# Patient Record
Sex: FEMALE | Race: WHITE | Employment: UNEMPLOYED | URBAN - METROPOLITAN AREA
[De-identification: names, ages, dates, MRNs, and addresses within clinical notes are randomized per-mention and may not be internally consistent; named-entity substitution may affect disease eponyms.]

---
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
## 2022-03-19 PROBLEM — E66.01 MORBID OBESITY WITH BMI OF 45.0-49.9, ADULT (HCC): Status: ACTIVE | Noted: 2017-11-15

## 2022-04-11 ENCOUNTER — OFFICE VISIT (OUTPATIENT)
Dept: ORTHOPEDIC SURGERY | Age: 22
End: 2022-04-11
Payer: OTHER GOVERNMENT

## 2022-04-11 VITALS — HEART RATE: 140 BPM | OXYGEN SATURATION: 98 % | WEIGHT: 293 LBS | BODY MASS INDEX: 55.32 KG/M2 | HEIGHT: 61 IN

## 2022-04-11 DIAGNOSIS — M22.2X2 PATELLOFEMORAL SYNDROME OF LEFT KNEE: ICD-10-CM

## 2022-04-11 DIAGNOSIS — G89.29 CHRONIC PAIN OF BOTH KNEES: Primary | ICD-10-CM

## 2022-04-11 DIAGNOSIS — M22.2X1 PATELLOFEMORAL SYNDROME OF RIGHT KNEE: ICD-10-CM

## 2022-04-11 DIAGNOSIS — M25.561 CHRONIC PAIN OF BOTH KNEES: Primary | ICD-10-CM

## 2022-04-11 DIAGNOSIS — M25.562 CHRONIC PAIN OF BOTH KNEES: Primary | ICD-10-CM

## 2022-04-11 DIAGNOSIS — M25.50 MULTIPLE JOINT PAIN: ICD-10-CM

## 2022-04-11 PROCEDURE — 99203 OFFICE O/P NEW LOW 30 MIN: CPT | Performed by: ORTHOPAEDIC SURGERY

## 2022-04-11 PROCEDURE — 73560 X-RAY EXAM OF KNEE 1 OR 2: CPT | Performed by: ORTHOPAEDIC SURGERY

## 2022-04-11 RX ORDER — VILAZODONE HYDROCHLORIDE 20 MG/1
TABLET ORAL
COMMUNITY
Start: 2022-02-21

## 2022-04-11 RX ORDER — PRAZOSIN HYDROCHLORIDE 2 MG/1
CAPSULE ORAL
COMMUNITY
Start: 2022-03-17

## 2022-04-11 RX ORDER — GALCANEZUMAB 120 MG/ML
INJECTION, SOLUTION SUBCUTANEOUS
COMMUNITY
Start: 2022-04-06

## 2022-04-11 RX ORDER — HYDROXYZINE 25 MG/1
TABLET, FILM COATED ORAL
COMMUNITY
Start: 2022-02-10

## 2022-04-11 RX ORDER — RIZATRIPTAN BENZOATE 10 MG/1
TABLET, ORALLY DISINTEGRATING ORAL
COMMUNITY
Start: 2022-04-06

## 2022-04-11 RX ORDER — ASPIRIN 325 MG
TABLET, DELAYED RELEASE (ENTERIC COATED) ORAL
COMMUNITY
Start: 2022-02-07

## 2022-04-11 RX ORDER — MELOXICAM 15 MG/1
15 TABLET ORAL DAILY
Qty: 30 TABLET | Refills: 1 | Status: SHIPPED | OUTPATIENT
Start: 2022-04-11

## 2022-04-11 RX ORDER — BUSPIRONE HYDROCHLORIDE 7.5 MG/1
TABLET ORAL
COMMUNITY
Start: 2022-02-17

## 2022-04-11 NOTE — PROGRESS NOTES
Bandar Valdivia  2000   Chief Complaint   Patient presents with    Knee Pain     both knees         HISTORY OF PRESENT ILLNESS  Bandar Valdivia is a 24 y.o. adult who presents today for evaluation of b/l knee. Bandar Valdivia rates Amarilys Castro's pain 2/10 today. Pain has been present since 2018. Having an aching and popping sensation that is intermittent in nature. Notes cracking when she was younger. Pain getting up from a sitting position and prolonged walking. Feels as though the knee is shifting. No prior treatment. She has c/o multiple joint pain in the ankle, knees, wrist and neck. She is hypermobile. Patient describes the pain as aching and popping and stiffness that is Intermittent in nature. Symptoms are worse with bending, stretching and straightening and is better with  patches, Ice. Associated symptoms include nothing. Since problem started, it: has worsened. Pain does not wake patient up at night. Has taken no meds for the problem. Has tried following treatments: Injections:NO; Brace:NO;  Therapy:NO; Cane/Crutch:NO       Allergies   Allergen Reactions    Pcn [Penicillins] Hives        Past Medical History:   Diagnosis Date    Depression     anxiety    GERD (gastroesophageal reflux disease)     Headache     migraines    Insomnia     Major depression     Migraine headache     PCOS (polycystic ovarian syndrome)     Polycystic ovarian disease       Social History     Socioeconomic History    Marital status: SINGLE     Spouse name: Not on file    Number of children: Not on file    Years of education: Not on file    Highest education level: Not on file   Occupational History    Not on file   Tobacco Use    Smoking status: Never Smoker    Smokeless tobacco: Never Used   Substance and Sexual Activity    Alcohol use: No    Drug use: No    Sexual activity: Not on file   Other Topics Concern    Not on file   Social History Narrative    Not on file     Social Determinants of Health     Financial Resource Strain:     Difficulty of Paying Living Expenses: Not on file   Food Insecurity:     Worried About Running Out of Food in the Last Year: Not on file    Naina of Food in the Last Year: Not on file   Transportation Needs:     Lack of Transportation (Medical): Not on file    Lack of Transportation (Non-Medical): Not on file   Physical Activity:     Days of Exercise per Week: Not on file    Minutes of Exercise per Session: Not on file   Stress:     Feeling of Stress : Not on file   Social Connections:     Frequency of Communication with Friends and Family: Not on file    Frequency of Social Gatherings with Friends and Family: Not on file    Attends Congregational Services: Not on file    Active Member of [address] [place] [address] [email or web address] or Organizations: Not on file    Attends Club or Organization Meetings: Not on file    Marital Status: Not on file   Intimate Partner Violence:     Fear of Current or Ex-Partner: Not on file    Emotionally Abused: Not on file    Physically Abused: Not on file    Sexually Abused: Not on file   Housing Stability:     Unable to Pay for Housing in the Last Year: Not on file    Number of [place] in the Last Year: Not on file    Unstable Housing in the Last Year: Not on file      History reviewed. No pertinent surgical history. Family History   Problem Relation Age of Onset    Diabetes Mother         type 2    Hypertension Mother     Thyroid Disease Father       Current Outpatient Medications   Medication Sig    Emgality Pen 120 mg/mL injection     cholecalciferol (VITAMIN D3) (50,000 UNITS /1250 MCG) capsule TAKE 1 CAPSULE BY MOUTH 2 TIMES A WEEK    Viibryd 20 mg tab tablet     rizatriptan (MAXALT-MLT) 10 mg disintegrating tablet     prazosin (MINIPRESS) 2 mg capsule     busPIRone (BUSPAR) 7.5 mg tablet     hydrOXYzine HCL (ATARAX) 25 mg tablet     fluvoxaMINE (LUVOX) 100 mg tablet Take 150 mg by mouth every evening.     propranolol (INDERAL) 60 mg tablet Take 60 mg by mouth three (3) times daily. (Patient not taking: Reported on 4/11/2022)     No current facility-administered medications for this visit. REVIEW OF SYSTEM   Patient denies: Weight loss, Fever/Chills, HA, Visual changes, Fatigue, Chest pain, SOB, Abdominal pain, N/V/D/C, Blood in stool or urine, Edema. Pertinent positive as above in HPI. All others were negative    PHYSICAL EXAM:   Visit Vitals  Pulse (!) 140 Comment: states she will tell her pcp about this   Ht 5' 0.5\" (1.537 m)   Wt 319 lb (144.7 kg)   SpO2 98%   BMI 61.27 kg/m²     The patient is a well-developed, well-nourished adult   in no acute distress. The patient is alert and oriented times three. The patient is alert and oriented times three. Mood and affect are normal.  LYMPHATIC: lymph nodes are not enlarged and are within normal limits  SKIN: normal in color and non tender to palpation. There are no bruises or abrasions noted. NEUROLOGICAL: Motor sensory exam is within normal limits. Reflexes are equal bilaterally. There is normal sensation to pinprick and light touch  MUSCULOSKELETAL:  Examination Left knee Right knee   Skin Intact Intact   Range of motion 0-130 0-130   Effusion + +   Medial joint line tenderness + +   Lateral joint line tenderness - -   Tenderness Pes Bursa - -   Tenderness insertion MCL - -   Tenderness insertion LCL - -   Harishs - -   Patella crepitus + +   Patella grind + +   Lachman - -   Pivot shift - -   Anterior drawer - -   Posterior drawer - -   Varus stress - -   Valgus stress - -   Neurovascular Intact Intact   Calf Swelling and Tenderness to Palpation - -   Cristin's Test - -   Hamstring Cord Tightness - -        PROCEDURE: none    IMAGING: XR of the b/l knee with 2 views obtained in the office dated 4/11/2022 was reviewed and read by Dr. Rebbeca Nageotte: mild degenerative changes in the patellofemoral joint      IMPRESSION:      ICD-10-CM ICD-9-CM    1.  Chronic pain of both knees  M25.561 719.46 AMB POC XRAY, KNEE; 1/2 VIEWS    M25.562 338.29 AMB POC XRAY, KNEE; 1/2 VIEWS    G89.29     2. Patellofemoral syndrome of left knee  M22.2X2 719.46 REFERRAL TO PHYSICAL THERAPY   3. Patellofemoral syndrome of right knee  M22.2X1 719.46 REFERRAL TO PHYSICAL THERAPY   4. Multiple joint pain  M25.50 719.49 REFERRAL TO RHEUMATOLOGY        PLAN:  1. Patient presents today with b/l knee pain due to patellofemoral syndrome. I am hopeful PT and Mobic will provide relief. Start with Mobic to help with inflammation. Referring to rheumatology to assess multiple joint pain. Risk factors include: BMI>60  2. No ultrasound exam indicated today  3. No cortisone injection indicated today   4. Yes Physical/Occupational Therapy indicated today  5. No diagnostic test indicated today:   6. No durable medical equipment indicated today  7. Yes referral indicated today RHEUM   8. Yes medications indicated today:   9. No Narcotic indicated today       RTC 6 weeks       Scribed by Hina Abreu [facility]) as dictated by Radha Dyer MD    I, Dr. Radha Dyer, confirm that all documentation is accurate.     Radha Dyer M.D.   [facility]

## 2022-04-21 RX ORDER — GUANFACINE 1 MG/1
1 TABLET, EXTENDED RELEASE ORAL DAILY
COMMUNITY

## 2022-04-21 NOTE — PERIOP NOTES
PRE-SURGICAL INSTRUCTIONS        Patient's Name:  Hilton Guzman      VZHFK'[id number] Date:  4/21/2022            Covid Testing Date and Time:    Surgery Date:  4/26/2022                1. Do NOT eat or drink anything, including candy, gum, or ice chips after midnight on 04/25/22, unless you have specific instructions from your surgeon or anesthesia provider to do so.  2. You may brush your teeth before coming to the hospital.  3. No smoking 24 hours prior to the day of surgery. 4. No alcohol 24 hours prior to the day of surgery. 5. No recreational drugs for one week prior to the day of surgery. 6. Leave all valuables, including money/purse, at home. 7. Remove all jewelry, nail polish, acrylic nails, and makeup (including mascara); no lotions powders, deodorant, or perfume/cologne/after shave on the skin. 8. Follow instruction for Hibiclens washes and CHG wipes from surgeon's office. 9. Glasses/contact lenses and dentures may be worn to the hospital.  They will be removed prior to surgery. 10. Call your doctor if symptoms of a cold or illness develop within 24-48 hours prior to your surgery. 11.  If you are having an outpatient procedure, please make arrangements for a responsible ADULT TO [address] [place] [address] and stay with you for 24 hours after your surgery. 12. ONE VISITOR in the hospital at this time for outpatient procedures. Exceptions may be made for surgical admissions, per nursing unit guidelines      Special Instructions:      Bring list of CURRENT medications. Bring any pertinent legal medical records. Follow physician instructions about stopping anticoagulants. Complete bowel prep per MD instructions. On the day of surgery, come in the main entrance of DR[facility] CR[facility]". Let the  at the desk know you are there for surgery. A staff member will come escort you to the surgical area on the second floor.     If you have any questions or concerns, please do not hesitate to call:     (Prior to the day of surgery) [facility] department:  [phone]   (Day of surgery) Pre-Op department:  [phone]    These surgical instructions were reviewed with patient during the PAT phone call.

## 2022-04-25 ENCOUNTER — ANESTHESIA EVENT (OUTPATIENT)
Dept: ENDOSCOPY | Age: 22
End: 2022-04-25
Payer: OTHER GOVERNMENT

## 2022-04-26 ENCOUNTER — ANESTHESIA (OUTPATIENT)
Dept: ENDOSCOPY | Age: 22
End: 2022-04-26
Payer: OTHER GOVERNMENT

## 2022-04-26 ENCOUNTER — HOSPITAL ENCOUNTER (OUTPATIENT)
Age: 22
Setting detail: OUTPATIENT SURGERY
Discharge: HOME | End: 2022-04-26
Attending: INTERNAL MEDICINE | Admitting: INTERNAL MEDICINE
Payer: OTHER GOVERNMENT

## 2022-04-26 VITALS
DIASTOLIC BLOOD PRESSURE: 90 MMHG | SYSTOLIC BLOOD PRESSURE: 137 MMHG | BODY MASS INDEX: 47.09 KG/M2 | HEART RATE: 108 BPM | HEIGHT: 66 IN | RESPIRATION RATE: 20 BRPM | TEMPERATURE: 99 F | OXYGEN SATURATION: 97 % | WEIGHT: 293 LBS

## 2022-04-26 LAB — HCG SERPL QL: NEGATIVE

## 2022-04-26 PROCEDURE — 88305 TISSUE EXAM BY PATHOLOGIST: CPT

## 2022-04-26 PROCEDURE — 74011000250 HC RX REV CODE- 250: Performed by: NURSE ANESTHETIST, CERTIFIED REGISTERED

## 2022-04-26 PROCEDURE — 74011250636 HC RX REV CODE- 250/636: Performed by: NURSE ANESTHETIST, CERTIFIED REGISTERED

## 2022-04-26 PROCEDURE — 76040000007: Performed by: INTERNAL MEDICINE

## 2022-04-26 PROCEDURE — 84703 CHORIONIC GONADOTROPIN ASSAY: CPT

## 2022-04-26 PROCEDURE — 2709999900 HC NON-CHARGEABLE SUPPLY: Performed by: INTERNAL MEDICINE

## 2022-04-26 PROCEDURE — 77030019988 HC FCPS ENDOSC DISP BSC -B: Performed by: INTERNAL MEDICINE

## 2022-04-26 PROCEDURE — 77030013992 HC SNR POLYP ENDOSC BSC -B: Performed by: INTERNAL MEDICINE

## 2022-04-26 PROCEDURE — 77030021593 HC FCPS BIOP ENDOSC BSC -A: Performed by: INTERNAL MEDICINE

## 2022-04-26 PROCEDURE — 00813 ANES UPR LWR GI NDSC PX: CPT | Performed by: STUDENT IN AN ORGANIZED HEALTH CARE EDUCATION/TRAINING PROGRAM

## 2022-04-26 PROCEDURE — 77030008565 HC TBNG SUC IRR ERBE -B: Performed by: INTERNAL MEDICINE

## 2022-04-26 PROCEDURE — 00813 ANES UPR LWR GI NDSC PX: CPT | Performed by: NURSE ANESTHETIST, CERTIFIED REGISTERED

## 2022-04-26 PROCEDURE — 76060000032 HC ANESTHESIA 0.5 TO 1 HR: Performed by: INTERNAL MEDICINE

## 2022-04-26 RX ORDER — SODIUM CHLORIDE, SODIUM LACTATE, POTASSIUM CHLORIDE, CALCIUM CHLORIDE 600; 310; 30; 20 MG/100ML; MG/100ML; MG/100ML; MG/100ML
25 INJECTION, SOLUTION INTRAVENOUS CONTINUOUS
Status: CANCELLED | OUTPATIENT
Start: 2022-04-26

## 2022-04-26 RX ORDER — SODIUM CHLORIDE, SODIUM LACTATE, POTASSIUM CHLORIDE, CALCIUM CHLORIDE 600; 310; 30; 20 MG/100ML; MG/100ML; MG/100ML; MG/100ML
75 INJECTION, SOLUTION INTRAVENOUS CONTINUOUS
Status: DISCONTINUED | OUTPATIENT
Start: 2022-04-26 | End: 2022-04-26 | Stop reason: HOSPADM

## 2022-04-26 RX ORDER — SODIUM CHLORIDE 0.9 % (FLUSH) 0.9 %
5-40 SYRINGE (ML) INJECTION AS NEEDED
Status: DISCONTINUED | OUTPATIENT
Start: 2022-04-26 | End: 2022-04-26 | Stop reason: HOSPADM

## 2022-04-26 RX ORDER — PROPOFOL 10 MG/ML
INJECTION, EMULSION INTRAVENOUS AS NEEDED
Status: DISCONTINUED | OUTPATIENT
Start: 2022-04-26 | End: 2022-04-26 | Stop reason: HOSPADM

## 2022-04-26 RX ORDER — ONDANSETRON 2 MG/ML
4 INJECTION INTRAMUSCULAR; INTRAVENOUS ONCE
Status: CANCELLED | OUTPATIENT
Start: 2022-04-26 | End: 2022-04-26

## 2022-04-26 RX ORDER — SODIUM CHLORIDE 0.9 % (FLUSH) 0.9 %
5-40 SYRINGE (ML) INJECTION EVERY 8 HOURS
Status: DISCONTINUED | OUTPATIENT
Start: 2022-04-26 | End: 2022-04-26 | Stop reason: HOSPADM

## 2022-04-26 RX ORDER — LIDOCAINE HYDROCHLORIDE 20 MG/ML
INJECTION, SOLUTION EPIDURAL; INFILTRATION; INTRACAUDAL; PERINEURAL AS NEEDED
Status: DISCONTINUED | OUTPATIENT
Start: 2022-04-26 | End: 2022-04-26 | Stop reason: HOSPADM

## 2022-04-26 RX ORDER — LIDOCAINE HYDROCHLORIDE 10 MG/ML
0.1 INJECTION, SOLUTION EPIDURAL; INFILTRATION; INTRACAUDAL; PERINEURAL AS NEEDED
Status: DISCONTINUED | OUTPATIENT
Start: 2022-04-26 | End: 2022-04-26 | Stop reason: HOSPADM

## 2022-04-26 RX ADMIN — PROPOFOL 50 MG: 10 INJECTION, EMULSION INTRAVENOUS at 14:09

## 2022-04-26 RX ADMIN — LIDOCAINE HYDROCHLORIDE 100 MG: 20 INJECTION, SOLUTION EPIDURAL; INFILTRATION; INTRACAUDAL; PERINEURAL at 13:50

## 2022-04-26 RX ADMIN — PROPOFOL 50 MG: 10 INJECTION, EMULSION INTRAVENOUS at 14:00

## 2022-04-26 RX ADMIN — PROPOFOL 50 MG: 10 INJECTION, EMULSION INTRAVENOUS at 14:01

## 2022-04-26 RX ADMIN — PROPOFOL 50 MG: 10 INJECTION, EMULSION INTRAVENOUS at 13:55

## 2022-04-26 RX ADMIN — PROPOFOL 50 MG: 10 INJECTION, EMULSION INTRAVENOUS at 14:08

## 2022-04-26 RX ADMIN — SODIUM CHLORIDE, POTASSIUM CHLORIDE, SODIUM LACTATE AND CALCIUM CHLORIDE 75 ML/HR: 600; 310; 30; 20 INJECTION, SOLUTION INTRAVENOUS at 13:04

## 2022-04-26 RX ADMIN — PROPOFOL 50 MG: 10 INJECTION, EMULSION INTRAVENOUS at 13:57

## 2022-04-26 RX ADMIN — PROPOFOL 100 MG: 10 INJECTION, EMULSION INTRAVENOUS at 13:50

## 2022-04-26 RX ADMIN — PROPOFOL 50 MG: 10 INJECTION, EMULSION INTRAVENOUS at 13:52

## 2022-04-26 RX ADMIN — PROPOFOL 50 MG: 10 INJECTION, EMULSION INTRAVENOUS at 13:58

## 2022-04-26 RX ADMIN — PROPOFOL 50 MG: 10 INJECTION, EMULSION INTRAVENOUS at 13:53

## 2022-04-26 RX ADMIN — FAMOTIDINE 20 MG: 10 INJECTION INTRAVENOUS at 13:13

## 2022-04-26 RX ADMIN — PROPOFOL 50 MG: 10 INJECTION, EMULSION INTRAVENOUS at 14:06

## 2022-04-26 RX ADMIN — PROPOFOL 50 MG: 10 INJECTION, EMULSION INTRAVENOUS at 14:03

## 2022-04-26 NOTE — DISCHARGE INSTRUCTIONS
Upper GI Endoscopy: What to Expect at [address] had an upper GI endoscopy. Your doctor used a thin, lighted tube that bends to look at the inside of your esophagus, your stomach, and the first part of the small intestine, called the duodenum. After you have an endoscopy, you will stay at the hospital or clinic for 1 to 2 hours. This will allow the medicine to wear off. You will be able to go home after your doctor or nurse checks to make sure that you're not having any problems. You may have to stay overnight if you had treatment during the test. You may have a sore throat for a day or two after the test.  This care sheet gives you a general idea about what to expect after the test.  How can you care for yourself at home? Activity   · Rest as much as you need to after you go home. · You should be able to go back to your usual activities the day after the test.  Diet   · Follow your doctor's directions for eating after the test.  · Drink plenty of fluids (unless your doctor has told you not to). Medications   · If you have a sore throat the day after the test, use an over-the-counter spray to numb your throat. Follow-up care is a key part of your treatment and safety. Be sure to make and go to all appointments, and call your doctor if you are having problems. It's also a good idea to know your test results and keep a list of the medicines you take. When should you call for help? Call 911 anytime you think you may need emergency care. For example, call if:    · You passed out (lost consciousness). · You have trouble breathing. · You pass maroon or bloody stools. Call your doctor now or seek immediate medical care if:    · You have pain that does not get better after your take pain medicine. · You have new or worse belly pain. · You have blood in your stools. · You are sick to your stomach and cannot keep fluids down. · You have a fever.      · You cannot pass stools or gas.   Watch closely for changes in your health, and be sure to contact your doctor if:    · Your throat still hurts after a day or two. · You do not get better as expected. Where can you learn more? Go to http://www.lee.com/  Enter J454 in the search box to learn more about \"Upper GI Endoscopy: What to Expect at Home. \"  Current as of: September 8, 2021               Content Version: 13.2  © 2006-2022 [employer]. Care instructions adapted under license by [employer] (which disclaims liability or warranty for this information). If you have questions about a medical condition or this instruction, always ask your healthcare professional. [place] [address] any warranty or liability for your use of this information. Patient Education      Hiatal Hernia: Care Instructions  Your Care Instructions  A hiatal hernia occurs when part of the stomach bulges into the chest cavity. A hiatal hernia may allow stomach acid and juices to back up into the esophagus (acid reflux). This can cause a feeling of burning, warmth, heat, or pain behind the breastbone. This feeling may often occur after you eat, soon after you lie down, or when you bend forward, and it may come and go. You also may have a sour taste in your mouth. These symptoms are commonly known as heartburn or reflux. But not all hiatal hernias cause symptoms. Follow-up care is a key part of your treatment and safety. Be sure to make and go to all appointments, and call your doctor if you are having problems. It's also a good idea to know your test results and keep a list of the medicines you take. How can you care for yourself at home? · Take your medicines exactly as prescribed. Call your doctor if you think you are having a problem with your medicine.   · Do not take aspirin or other nonsteroidal anti-inflammatory drugs (NSAIDs), such as ibuprofen (Advil, Motrin) or naproxen (Aleve), unless your doctor says it is okay. Ask your doctor what you can take for pain. · Your doctor may recommend over-the-counter medicine. For mild or occasional indigestion, antacids such as Tums, Gaviscon, Maalox, or Mylanta may help. Your doctor also may recommend over-the-counter acid reducers, such as famotidine (Pepcid AC), cimetidine (Tagamet HB), or omeprazole (Prilosec). Read and follow all instructions on the label. If you use these medicines often, talk with your doctor. · Change your eating habits. ? It's best to eat several small meals instead of two or three large meals. ? After you eat, wait 2 to 3 hours before you lie down. Late-night snacks aren't a good idea. ? Avoid foods that make your symptoms worse. These may include chocolate, mint, alcohol, pepper, spicy foods, high-fat foods, or drinks with caffeine in them, such as tea, coffee, tangela, or energy drinks. If your symptoms are worse after you eat a certain food, you may want to stop eating it to see if your symptoms get better. · Do not smoke or chew tobacco.  · If you get heartburn at night, raise the head of your bed 6 to 8 inches by putting the frame on blocks or placing a foam wedge under the head of your mattress. (Adding extra pillows does not work.)  · Do not wear tight clothing around your middle. · Lose weight if you need to. Losing just 5 to 10 pounds can help. When should you call for help? Call your doctor now or seek immediate medical care if:    · You have new or worse belly pain.     · You are vomiting. Watch closely for changes in your health, and be sure to contact your doctor if:    · You have new or worse symptoms of indigestion.     · You have trouble or pain swallowing.     · You are losing weight.     · You do not get better as expected. Where can you learn more? Go to http://www.lee.com/  Enter T074 in the search box to learn more about \"Hiatal Hernia: Care Instructions. \"  Current as of: September 8, 2021               Content Version: 13.2  © 2006-2022 [employer]. Care instructions adapted under license by [employer] (which disclaims liability or warranty for this information). If you have questions about a medical condition or this instruction, always ask your healthcare professional. [address] any warranty or liability for your use of this information. Colonoscopy: What to Expect at [address] [facility]  After a colonoscopy, you'll stay at the clinic until you wake up. Then you can go home. But you'll need to arrange for a ride. Your doctor will tell you when you can eat and do your other usual activities. Your doctor will talk to you about when you'll need your next colonoscopy. Your doctor can help you decide how often you need to be checked. This will depend on the results of your test and your risk for colorectal cancer. After the test, you may be bloated or have gas pains. You may need to pass gas. If a biopsy was done or a polyp was removed, you may have streaks of blood in your stool (feces) for a few days. Problems such as heavy rectal bleeding may not occur until several weeks after the test. This isn't common. But it can happen after polyps are removed. This care sheet gives you a general idea about how long it will take for you to recover. But each person recovers at a different pace. Follow the steps below to get better as quickly as possible. How can you care for yourself at home? Activity    · Rest when you feel tired. · You can do your normal activities when it feels okay to do so. Diet    · Follow your doctor's directions for eating. · Unless your doctor has told you not to, drink plenty of fluids. This helps to replace the fluids that were lost during the colon prep. · Do not drink alcohol. Medicines    · Your doctor will tell you if and when you can restart your medicines.  You will also be given instructions about taking any new medicines. · If you take aspirin or some other blood thinner, ask your doctor if and when to start taking it again. Make sure that you understand exactly what your doctor wants you to do. · If polyps were removed or a biopsy was done during the test, your doctor may tell you not to take aspirin or other anti-inflammatory medicines for a few days. These include ibuprofen (Advil, Motrin) and naproxen (Aleve). Other instructions    · For your safety, do not drive or operate machinery until the medicine wears off and you can think clearly. Your doctor may tell you not to drive or operate machinery until the day after your test.     · Do not sign legal documents or make major decisions until the medicine wears off and you can think clearly. The anesthesia can make it hard for you to fully understand what you are agreeing to. Follow-up care is a key part of your treatment and safety. Be sure to make and go to all appointments, and call your doctor if you are having problems. It's also a good idea to know your test results and keep a list of the medicines you take. When should you call for help? Call 911 anytime you think you may need emergency care. For example, call if:    · You passed out (lost consciousness). · You pass maroon or bloody stools. · You have trouble breathing. Call your doctor now or seek immediate medical care if:    · You have pain that does not get better after you take pain medicine. · You are sick to your stomach or cannot drink fluids. · You have new or worse belly pain. · You have blood in your stools. · You have a fever. · You cannot pass stools or gas. Watch closely for changes in your health, and be sure to contact your doctor if you have any problems. Where can you learn more?   Go to http://www.gray.com/  Enter E264 in the search box to learn more about \"Colonoscopy: What to Expect at Home.\"  Current as of: September 8, 2021               Content Version: 13.2  © 6520-8199 [employer]. Care instructions adapted under license by [employer] (which disclaims liability or warranty for this information). If you have questions about a medical condition or this instruction, always ask your healthcare professional. [address] any warranty or liability for your use of this information. Patient Education      Colon Polyps: Care Instructions  Your Care Instructions     Colon polyps are growths in the colon or the rectum. The cause of most colon polyps is not known, and most people who get them do not have any problems. But a certain kind can turn into cancer. For this reason, regular testing for colon polyps is important for people as they get older. It is also important for anyone who has an increased risk for colon cancer. Polyps are usually found through routine colon cancer screening tests. Although most colon polyps are not cancerous, they are usually removed and then tested for cancer. Screening for colon cancer saves lives because the cancer can usually be cured if it is caught early. If you have a polyp that is the type that can turn into cancer, you may need more tests to examine your entire colon. The doctor will remove any other polyps that he or she finds, and you will be tested more often. Follow-up care is a key part of your treatment and safety. Be sure to make and go to all appointments, and call your doctor if you are having problems. It's also a good idea to know your test results and keep a list of the medicines you take. How can you care for yourself at home? Regular exams to look for colon polyps are the best way to prevent polyps from turning into colon cancer. These can include stool tests, sigmoidoscopy, colonoscopy, and CT colonography. Talk with your doctor about a testing schedule that is right for you.   To prevent polyps  There is no home treatment that can prevent colon polyps. But these steps may help lower your risk for cancer. · Stay active. Being active can help you get to and stay at a healthy weight. Try to exercise on most days of the week. Walking is a good choice. · Eat well. Choose a variety of vegetables, fruits, legumes (such as peas and beans), fish, poultry, and whole grains. · Do not smoke. If you need help quitting, talk to your doctor about stop-smoking programs and medicines. These can increase your chances of quitting for good. · If you drink alcohol, limit how much you drink. Limit alcohol to 2 drinks a day for men and 1 drink a day for women. When should you call for help? Call your doctor now or seek immediate medical care if:    · You have severe belly pain.     · Your stools are maroon or very bloody. Watch closely for changes in your health, and be sure to contact your doctor if:    · You have a fever.     · You have nausea or vomiting.     · You have a change in bowel habits (new constipation or diarrhea).     · Your symptoms get worse or are not improving as expected. Where can you learn more? Go to http://www.lee.com/  Enter C571 in the search box to learn more about \"Colon Polyps: Care Instructions. \"  Current as of: September 8, 2021               Content Version: 13.2  © 2006-2022 [email or web address]. Care instructions adapted under license by [employer] (which disclaims liability or warranty for this information). If you have questions about a medical condition or this instruction, always ask your healthcare professional. [place] [address] any warranty or liability for your use of this information. Patient Education      Hemorrhoids: Care Instructions  Overview     Hemorrhoids are swollen veins that develop in the anal canal. Bleeding during bowel movements, itching, and rectal pain are the most common symptoms. Hemorrhoids can be uncomfortable at times, but rarely are they a serious problem. Most of the time, you can treat them with simple changes to your diet and bowel habits. These changes include eating more fiber and not straining to pass stools. Most hemorrhoids don't need surgery or other treatment unless they are very large and painful or bleed a lot. Follow-up care is a key part of your treatment and safety. Be sure to make and go to all appointments, and call your doctor if you are having problems. It's also a good idea to know your test results and keep a list of the medicines you take. How can you care for yourself at home? · Sit in a few inches of warm water (sitz bath) 3 times a day and after bowel movements. The warm water helps with pain and itching. · Put ice on your anal area several times a day for 10 minutes at a time. Put a thin cloth between the ice and your skin. Follow this by placing a warm, wet towel on the area for another 10 to 20 minutes. · Take pain medicines exactly as directed. ? If the doctor gave you a prescription medicine for pain, take it as prescribed. ? If you are not taking a prescription pain medicine, ask your doctor if you can take an over-the-counter medicine. · Keep the anal area clean, but be gentle. Use water and a fragrance-free soap, or use baby wipes or medicated pads such as Tucks. · Wear cotton underwear and loose clothing to decrease moisture in the anal area. · Eat more fiber. Include foods such as whole-grain breads and cereals, raw vegetables, raw and dried fruits, and beans. · Drink plenty of fluids. If you have kidney, heart, or liver disease and have to limit fluids, talk with your doctor before you increase the amount of fluids you drink. · Use a stool softener that contains bran or psyllium. You can save money by buying bran or psyllium (available in bulk at most health food stores) and sprinkling it on foods or stirring it into fruit juice.  Or you can use a product such as Metamucil or Hydrocil. · Practice healthy bowel habits. ? Go to the bathroom as soon as you have the urge. ? Avoid straining to pass stools. Relax and give yourself time to let things happen naturally. ? Do not hold your breath while passing stools. ? Do not read while sitting on the toilet. Get off the toilet as soon as you have finished. · Take your medicines exactly as prescribed. Call your doctor if you think you are having a problem with your medicine. When should you call for help? Call 911 anytime you think you may need emergency care. For example, call if:    · You pass maroon or very bloody stools. Call your doctor now or seek immediate medical care if:    · You have increased pain.     · You have increased bleeding. Watch closely for changes in your health, and be sure to contact your doctor if:    · Your symptoms have not improved after 3 or 4 days. Where can you learn more? Go to http://www.lee.com/  Enter F228 in the search box to learn more about \"Hemorrhoids: Care Instructions. \"  Current as of: September 8, 2021               Content Version: 13.2  © 3100-9088 [employer]. Care instructions adapted under license by [employer] (which disclaims liability or warranty for this information). If you have questions about a medical condition or this instruction, always ask your healthcare professional. [place] [address] any warranty or liability for your use of this information. DISCHARGE SUMMARY from Nurse     POST-PROCEDURE INSTRUCTIONS:    Call your Physician if you:  ? Observe any excess bleeding. ? Develop a temperature over 100.5o F.  ? Experience abdominal, shoulder or chest pain. ? Notice any signs of decreased circulation or nerve impairment to an extremity such as a change in color, persistent numbness, tingling, coldness or increase in pain. ?  Vomit blood or you have nausea and vomiting lasting longer than 4 hours. ? Are unable to take medications. ? Are unable to urinate within 8 hours after discharge following general anesthesia or intravenous sedation. For the next 24 hours after receiving general anesthesia or intravenous sedation, or while taking prescription Narcotics, limit your activities:  ? Do NOT drive a motor vehicle, operate hazard machinery or power tools, or perform tasks that require coordination. The medication you received during your procedure may have some effect on your mental awareness. ? Do NOT make important personal or business decisions. The medication you received during your procedure may have some effect on your mental awareness. ? Do NOT drink alcoholic beverages. These drinks do not mix well with the medications that have been given to you. ? Upon discharge from the hospital, you must be accompanied by a responsible adult. ? Resume your diet as directed by your physician. ? Resume medications as your physician has prescribed. ? Please give a list of your current medications to your Primary Care Provider. ? Please update this list whenever your medications are discontinued, doses are changed, or new medications (including over-the-counter products) are added. ? Please carry medication information at all times in case of emergency situations. These are general instructions for a healthy lifestyle:    No smoking/ No tobacco products/ Avoid exposure to second hand smoke.  Surgeon General's Warning:  Quitting smoking now greatly reduces serious risk to your health. Obesity, smoking, and a sedentary lifestyle greatly increase your risk for illness.    A healthy diet, regular physical exercise & weight monitoring are important for maintaining a healthy lifestyle   You may be retaining fluid if you have a history of heart failure or if you experience any of the following symptoms:  Weight gain of 3 pounds or more overnight or 5 pounds in a week, increased swelling in our hands or feet or shortness of breath while lying flat in bed. Please call your doctor as soon as you notice any of these symptoms; do not wait until your next office visit. Recognize signs and symptoms of STROKE:  F  -  Face looks uneven  A  -  Arms unable to move or move unevenly  S  -  Speech slurred or non-existent  T  -  Time to call 911 - as soon as signs and symptoms begin - DO NOT go back to bed or wait to see If you get better - TIME IS BRAIN. Colorectal Screening   Colorectal cancer almost always develops from precancerous polyps (abnormal growths) in the colon or rectum. Screening tests can find precancerous polyps, so that they can be removed before they turn into cancer. Screening tests can also find colorectal cancer early, when treatment works best.  [facility] Speak with your physician about when you should begin screening and how often you should be tested. [employer] [place] Activation    Thank you for requesting access to [employer]. Please follow the instructions below to securely access and download your online medical record. [employer] [place] allows you to send messages to your doctor, view your test results, renew your prescriptions, schedule appointments, and more. How Do I Sign Up? 1. In your internet browser, go to [email or web address]. 2. Click on the First Time User? Click Here link in the Sign In box. You will see the New Member Sign Up page. 3. Enter your [employer] [place] Access Code exactly as it appears below. You will not need to use this code after youve completed the sign-up process. If you do not sign up before the expiration date, you must request a new code. [employer] [place] Access Code: Activation code not generated  Current [employer] [place] Status: Active (This is the date your [employer] [place] access code will )    4.  Enter the last four digits of your Social Security Number (xxxx) and Date of Birth (mm/dd/yyyy) as indicated and click Submit. You will be taken to the next sign-up page. 5. Create a Furn[employer]sht ID. This will be your MobileMD login ID and cannot be changed, so think of one that is secure and easy to remember. 6. Create a MobileMD password. You can change your password at any time. 7. Enter your Password Reset Question and Answer. This can be used at a later time if you forget your password. 8. Enter your e-mail address. You will receive e-mail notification when new information is available in [address]. 9. Click Sign Up. You can now view and download portions of your medical record. 10. Click the Download Summary menu link to download a portable copy of your medical information. Additional Information    If you have questions, please call [phone]. Remember, MobileMD is NOT to be used for urgent needs. For medical emergencies, dial 911. Educational references and/or instructions provided during this visit included:    See Attached      APPOINTMENTS:    Per MD Instruction    Discharge information has been reviewed with the patient. The patient verbalized understanding.

## 2022-04-26 NOTE — ANESTHESIA PREPROCEDURE EVALUATION
Relevant Problems   No relevant active problems       Anesthetic History   No history of anesthetic complications            Review of Systems / Medical History  Patient summary reviewed, nursing notes reviewed and pertinent labs reviewed    Pulmonary  Within defined limits                 Neuro/Psych         Headaches and psychiatric history     Cardiovascular  Within defined limits                     GI/Hepatic/Renal     GERD: well controlled           Endo/Other        Morbid obesity     Other Findings              Physical Exam    Airway  Mallampati: II  TM Distance: 4 - 6 cm  Neck ROM: normal range of motion   Mouth opening: Normal     Cardiovascular    Rhythm: regular  Rate: normal         Dental  No notable dental hx       Pulmonary  Breath sounds clear to auscultation               Abdominal  GI exam deferred       Other Findings            Anesthetic Plan    ASA: 3  Anesthesia type: MAC          Induction: Intravenous  Anesthetic plan and risks discussed with: Patient

## 2022-04-26 NOTE — ANESTHESIA POSTPROCEDURE EVALUATION
Procedure(s):  UPPER ENDOSCOPY, bx's  COLONOSCOPY, bx's, polypectomy.     MAC    Anesthesia Post Evaluation      Multimodal analgesia: multimodal analgesia used between 6 hours prior to anesthesia start to PACU discharge  Patient location during evaluation: PACU  Patient participation: complete - patient participated  Level of consciousness: sleepy but conscious  Pain management: adequate  Airway patency: patent  Anesthetic complications: no  Cardiovascular status: acceptable  Respiratory status: acceptable  Hydration status: acceptable  Post anesthesia nausea and vomiting:  controlled  Final Post Anesthesia Temperature Assessment:  Normothermia (36.0-37.5 degrees C)      INITIAL Post-op Vital signs:   Vitals Value Taken Time   /73 04/26/22 1419   Temp 36.4 °C (97.5 °F) 04/26/22 1419   Pulse 108 04/26/22 1419   Resp 24 04/26/22 1419   SpO2 100 % 04/26/22 1419

## 2022-04-26 NOTE — PROGRESS NOTES
[email or web address] [address]  [place], [address]      Brief Procedure Note    Neal Pitch  2000  [id number]    Date of Procedure: 4/26/2022    Preoperative diagnosis: Chronic diarrhea:  K52.9  Blood in stool:  K92.1  Stomach cramps:  789.00 - R10.9  GERD:  530.81 - K21.9  Morbid obesity:  278.01 - E66.01    Postoperative diagnosis: EGD: Hiatal hernia 38, 40 cm  [place]: transverse polyp, hemorrhoids     Type of Anesthesia: MAC (Monitored anesthesia care)    Description of findings: same as post op dx    Procedure: Procedure(s):  UPPER ENDOSCOPY, bx's  COLONOSCOPY, bx's, polypectomy    :  Dr. Blaire Xie MD    Assistant(s): Endoscopy Technician-1: Annetta Barrios  Endoscopy RN-1: Slade Marcano RN    Devices/implants/grafts/tissues/prosthesis: None    EBL:None    Specimens:   ID Type Source Tests Collected by Time Destination   1 : duodenal bx's Preservative Duodenum  Mary Mercado MD 4/26/2022 1354 Pathology   2 : TI bx's Preservative Ileum, Terminal  Mary Mercado MD 4/26/2022 1400 Pathology   3 : transverse polyp Preservative Colon  Mary Mercado MD 4/26/2022 1400 Pathology   4 : ramdon colon bx's Preservative Colon  Mary Mercado MD 4/26/2022 1405 Pathology       Findings: See printed and scanned procedure note    Complications: None    Dr. Blaire Xie MD  4/26/2022  2:28 PM

## 2022-04-26 NOTE — OP NOTES
Patient alert and oriented appear to be a little bit anxious, Pulse rate elevated  ; Dr. Corin Ramirez anesthesia made aware , he seen patient . No orders or meds given. Patient resting  , no distress noted waiting for procedure.

## 2022-04-26 NOTE — H&P
[email or web address]  [phone]    GASTROENTEROLOGY Pre-Procedure H and P      Impression/Plan:   1. This patient is consented for an EGD and colonoscopy for Rectal bleeding    Chief Complaint: Rectal bleeding  HPI:  Alli Yap is a 24 y.o. adult who is being is having an EGD and colonoscopy for Rectal bleedingPMH:   Past Medical History:   Diagnosis Date    Autism     Depression     anxiety    GERD (gastroesophageal reflux disease)     Headache     migraines    Insomnia     Major depression     Migraine headache     PCOS (polycystic ovarian syndrome)     Polycystic ovarian disease        PSH:   History reviewed. No pertinent surgical history. Social HX:   Social History     Socioeconomic History    Marital status: SINGLE     Spouse name: Not on file    Number of children: Not on file    Years of education: Not on file    Highest education level: Not on file   Occupational History    Not on file   Tobacco Use    Smoking status: Never Smoker    Smokeless tobacco: Never Used   Vaping Use    Vaping Use: Never used   Substance and Sexual Activity    Alcohol use: Never    Drug use: Never    Sexual activity: Not on file   Other Topics Concern    Not on file   Social History Narrative    Not on file     Social Determinants of Health     Financial Resource Strain:     Difficulty of Paying Living Expenses: Not on file   Food Insecurity:     Worried About [address] [employer] in the Last Year: Not on file    Naina of Food in the Last Year: Not on file   Transportation Needs:     Lack of Transportation (Medical): Not on file    Lack of Transportation (Non-Medical):  Not on file   Physical Activity:     Days of Exercise per Week: Not on file    Minutes of Exercise per Session: Not on file   Stress:     Feeling of Stress : Not on file   Social Connections:     Frequency of Communication with Friends and Family: Not on file    Frequency of Social Gatherings with Friends and Family: Not on file    Attends Church Services: Not on file    Active Member of Clubs or Organizations: Not on file    Attends Club or Organization Meetings: Not on file    Marital Status: Not on file   Intimate Partner Violence:     Fear of Current or Ex-Partner: Not on file    Emotionally Abused: Not on file    Physically Abused: Not on file    Sexually Abused: Not on file   Housing Stability:     Unable to Pay for Housing in the Last Year: Not on file    Number of [place] in the Last Year: Not on file    Unstable Housing in the Last Year: Not on file       FHX:   Family History   Problem Relation Age of Onset    Diabetes Mother         type 2    Hypertension Mother     Thyroid Disease Father        Allergy:   Allergies   Allergen Reactions    Pcn [Penicillins] Hives       Home Medications:     Medications Prior to Admission   Medication Sig    psyllium husk (METAMUCIL PO) Take  by mouth.  guanFACINE ER (INTUNIV) 1 mg ER tablet Take 1 mg by mouth daily.  Emgality Pen 120 mg/mL injection     cholecalciferol (VITAMIN D3) (50,000 UNITS /1250 MCG) capsule TAKE 1 CAPSULE BY MOUTH 2 TIMES A WEEK    Viibryd 20 mg tab tablet     rizatriptan (MAXALT-MLT) 10 mg disintegrating tablet     prazosin (MINIPRESS) 2 mg capsule     busPIRone (BUSPAR) 7.5 mg tablet     hydrOXYzine HCL (ATARAX) 25 mg tablet     meloxicam (MOBIC) 15 mg tablet Take 1 Tablet by mouth daily. (Patient not taking: Reported on 4/21/2022)       Review of Systems:     Constitutional: No fevers, chills, weight loss, fatigue. Skin: No rashes, pruritis, jaundice, ulcerations, erythema. HENT: No headaches, nosebleeds, sinus pressure, rhinorrhea, sore throat. Eyes: No visual changes, blurred vision, eye pain, photophobia, jaundice. Cardiovascular: No chest pain, heart palpitations. Respiratory: No cough, SOB, wheezing, chest discomfort, orthopnea.    Gastrointestinal: Neg unless noted otherwise in H&P   Genitourinary: No dysuria, bleeding, discharge, pyuria. Musculoskeletal: No weakness, arthralgias, wasting. Endo: No sweats. Heme: No bruising, easy bleeding. Allergies: As noted. Neurological: Cranial nerves intact. Alert and oriented. Gait not assessed. Psychiatric:  No anxiety, depression, hallucinations. Visit Vitals  BP (!) 149/82 (BP 1 Location: Right arm, BP Patient Position: At rest)   Pulse (!) 121   Temp 98.7 °F (37.1 °C)   Resp 20   Ht 5' 5.5\" (1.664 m)   Wt 142.9 kg (315 lb)   SpO2 97%   BMI 51.62 kg/m²       Physical Assessment:     constitutional: appearance: well developed, well nourished, normal habitus, no deformities, in no acute distress. skin: inspection: no rashes, ulcers, icterus or other lesions; no clubbing or telangiectasias. palpation: no induration or subcutaneos nodules. eyes: inspection: normal conjunctivae and lids; no jaundice pupils: normal  ENMT: mouth: normal oral mucosa,lips and gums; good dentition. oropharynx: normal tongue, hard and soft palate; posterior pharynx without erithema, exudate or lesions. neck: thyroid: normal size, consistency and position; no masses or tenderness. respiratory: effort: normal chest excursion; no intercostal retraction or accessory muscle use. cardiovascular: abdominal aorta: normal size and position; no bruits. palpation: PMI of normal size and position; normal rhythm; no thrill or murmurs. abdominal: abdomen: normal consistency; no tenderness or masses. hernias: no hernias appreciated. liver: normal size and consistency. spleen: not palpable. rectal: hemoccult/guaiac: not performed. musculoskeletal: digits and nails: no clubbing, cyanosis, petechiae or other inflammatory conditions. gait: normal gait and station head and neck: normal range of motion; no pain, crepitation or contracture. spine/ribs/pelvis: normal range of motion; no pain, deformity or contracture.    neurologic: cranial nerves: II-XII normal.   psychiatric: judgement/insight: within normal limits. memory: within normal limits for recent and remote events. mood and affect: no evidence of depression, anxiety or agitation. orientation: oriented to time, space and person. Basic Metabolic Profile   No results for input(s): NA, K, CL, CO2, BUN, GLU, CA, MG, PHOS in the last 72 hours. No lab exists for component: CREAT      CBC w/Diff    No results for input(s): WBC, RBC, HGB, HCT, MCV, MCH, MCHC, RDW, PLT, HGBEXT, HCTEXT, PLTEXT in the last 72 hours. No lab exists for component: MPV No results for input(s): GRANS, LYMPH, EOS, PRO, MYELO, METAS, BLAST in the last 72 hours. No lab exists for component: MONO, BASO     Hepatic Function   No results for input(s): ALB, TP, TBILI, AP, AML, LPSE in the last 72 hours. No lab exists for component: DBILI, GPT, SGOT     Coags   No results for input(s): PTP, INR, APTT, INREXT in the last 72 hours. Diaz Christianson MD  [facility] [address]  Cell: [phone]  Direct pager: [phone]  [email or web address] [email or web address]  [email or web address]. [employer]

## 2022-06-09 ENCOUNTER — TRANSCRIBE ORDER (OUTPATIENT)
Dept: SCHEDULING | Age: 22
End: 2022-06-09

## 2022-06-09 DIAGNOSIS — R00.0 RAPID HEART BEAT: Primary | ICD-10-CM

## 2022-06-16 ENCOUNTER — HOSPITAL ENCOUNTER (OUTPATIENT)
Dept: NON INVASIVE DIAGNOSTICS | Age: 22
Discharge: HOME | End: 2022-06-16
Attending: NURSE PRACTITIONER
Payer: OTHER GOVERNMENT

## 2022-06-16 DIAGNOSIS — R00.0 RAPID HEART BEAT: ICD-10-CM

## 2022-06-16 PROCEDURE — 93226 XTRNL ECG REC<48 HR SCAN A/R: CPT

## 2022-06-16 PROCEDURE — 93225 XTRNL ECG REC<48 HRS REC: CPT | Performed by: INTERNAL MEDICINE

## 2022-06-23 ENCOUNTER — HOSPITAL ENCOUNTER (OUTPATIENT)
Dept: PHYSICAL THERAPY | Age: 22
Discharge: HOME | End: 2022-06-23
Payer: OTHER GOVERNMENT

## 2022-06-23 PROCEDURE — 97110 THERAPEUTIC EXERCISES: CPT

## 2022-06-23 PROCEDURE — 97112 NEUROMUSCULAR REEDUCATION: CPT

## 2022-06-23 PROCEDURE — 97162 PT EVAL MOD COMPLEX 30 MIN: CPT

## 2022-06-23 PROCEDURE — 97530 THERAPEUTIC ACTIVITIES: CPT

## 2022-06-23 NOTE — PROGRESS NOTES
PT DAILY TREATMENT NOTE     Patient Name: Abad Ramos  Date:2022  : 2000  [x]  Patient  Verified  Payor:  / Plan: [facility]ION / Product Type:  /    In time:308  Out time:357  Total Treatment Time (min): 49  Visit #: 1 of 5    Treatment Area: Pain in left knee [M25.562]  Pain in right knee [M25.561]    SUBJECTIVE  Pain Level (0-10 scale): 3-4  Any medication changes, allergies to medications, adverse drug reactions, diagnosis change, or new procedure performed?: [x] No    [] Yes (see summary sheet for update)  Subjective functional status/changes:   [] No changes reported  See POC    OBJECTIVE      20 min []Eval                  []Re-Eval       10 min Therapeutic Exercise:  [x] See flow sheet : explanation, demonstration, performance and distribution of HEP   Rationale: increase ROM and increase strength to improve the patients ability to perform ADLs    10 min Therapeutic Activity:  []  See flow sheet : edu on PT diagnosis, prognosis, hypermobility management, orthotics and appropriate footwear, bracing   Rationale: education   to improve the patients ability to understand roles and goals of PT     9 min Neuromuscular Re-education:  [x]  See flow sheet : explanation, demonstration, performance and distribution of HEP   Rationale: increase strength, improve coordination, improve balance and increase proprioception  to improve the patients ability to improve stability in stance          With   [] TE   [] TA   [] neuro   [] other: Patient Education: [x] Review HEP    [] Progressed/Changed HEP based on:   [] positioning   [] body mechanics   [] transfers   [] heat/ice application    [] other:      Other Objective/Functional Measures: see POC     Pain Level (0-10 scale) post treatment: 2    ASSESSMENT/Changes in Function: see POC    Patient will continue to benefit from skilled PT services to modify and progress therapeutic interventions, address functional mobility deficits, address ROM deficits, address strength deficits, analyze and address soft tissue restrictions, analyze and cue movement patterns, analyze and modify body mechanics/ergonomics, assess and modify postural abnormalities, address imbalance/dizziness and instruct in home and community integration to attain remaining goals. [x]  See Plan of Care  []  See progress note/recertification  []  See Discharge Summary         Progress towards goals / Updated goals:  Short Term Goals: To be accomplished in 2 weeks:  1. Pt will report compliance HEP in order to supplement PT treatment               Eval = established     Long Term Goals: To be accomplished in 5 treatments:  1. Pt will demonstrate bilateral hip strength grossly 5-/5 in order to improve ability to perform work duties. Eval =  Grossly 4/5 except ext = 3+/5  2. Pt will demonstrate bilateral knee strength minimum 5-/5 in order to improve ambulation distances for ADLs and community engagement. Eval = grossly 4/5  3. Pt will score at least predicted value on FOTO in order to improve overall function, decrease pain, and facilitate return to PLOF. Eval = complete at first follow up    PLAN  [x]  Upgrade activities as tolerated     [x]  Continue plan of care  []  Update interventions per flow sheet       []  Discharge due to:_  []  Other:_      Camille Boykin, PT 6/23/2022  3:41 PM    No future appointments.

## 2022-06-23 NOTE — PROGRESS NOTES
[facility] [place] [id number]  [address], [address] [phone] fax    Plan of Care/ Statement of Necessity for Physical Therapy Services           Patient name: Dmitry Dodson Start of Care: 2022   Referral source: Charles Crawford NP : 2000    Medical Diagnosis: Pain in left knee [M25.562]  Pain in right knee [M25.561]  Payor: [employer] / Plan: [address] / Product Type: Michele Goodwin /  Onset Date:chronic with exacerbation 22    Treatment Diagnosis: right knee pain, left knee pain, generalized weakness   Prior Hospitalization: see medical history Provider#: [id number]   Medications: Verified on Patient summary List    Comorbidities: possible EDS and POTS, depression    Prior Level of Function: functionally (I); works delivering [employer] and following information is based on the information from the initial evaluation. Assessment/ key information:   Dmitry Dodson is a 17yo who presents to PT with generalized hypermobility and generalized weakness. Patellar hypermobility and decreased LE strength contribute to PFPS. While referral specifies B knees, pt would likely benefit from a generalized strengthening program to address other areas of concern including ankles, shoulders, elbows, and wrists. If referring provider is agreeable to generalized program, please sign below. Pt demonstrates an 8/9 on the beighton scale, hypermobility of bilateral patella, and decreased strength. In addition, pt demonstrates increased navicular drop; discussed addition of orthotics in her high top converse for ankle/knee stability at work. Pt deficits impair her ability to perform work duties and ADLs at [employer].  Pt will benefit from skilled PT in order to address listed deficits, decrease pain, and maximize functional potential. Pt would benefit from attending PT 2-3x/week, however, due to the financial constraints of a high copay pt will attend 1x/week. Evaluation Complexity History MEDIUM  Complexity : 1-2 comorbidities / personal factors will impact the outcome/ POC ; Examination MEDIUM Complexity : 3 Standardized tests and measures addressing body structure, function, activity limitation and / or participation in recreation  ;Presentation MEDIUM Complexity : Evolving with changing characteristics  ; Clinical Decision Making MEDIUM Complexity : FOTO score of 26-74  Overall Complexity Rating: MEDIUM  Problem List: pain affecting function, decrease ROM, decrease strength, edema affecting function, impaired gait/ balance, decrease ADL/ functional abilitiies, decrease activity tolerance, decrease flexibility/ joint mobility and decrease transfer abilities   Treatment Plan may include any combination of the following: Therapeutic exercise, Therapeutic activities, Neuromuscular re-education, Physical agent/modality, Gait/balance training, Manual therapy, Aquatic therapy, Patient education, Self Care training, Functional mobility training, Home safety training and Stair training  Patient / Family readiness to learn indicated by: asking questions, trying to perform skills and interest  Persons(s) to be included in education: patient (P)  Barriers to Learning/Limitations: None  Patient Goal (s): i want to be in less pain  Patient Self Reported Health Status: fair  Rehabilitation Potential: good/fair due to inability to attend at recommended frequency   Short Term Goals: To be accomplished in 2 weeks:  1. Pt will report compliance HEP in order to supplement PT treatment   Eval = established    Long Term Goals: To be accomplished in 5 treatments:  1. Pt will demonstrate bilateral hip strength grossly 5-/5 in order to improve ability to perform work duties. Eval =  Grossly 4/5 except ext = 3+/5  2. Pt will demonstrate bilateral knee strength minimum 5-/5 in order to improve ambulation distances for ADLs and community engagement.     Eval = grossly 4/5  3. Pt will score at least predicted value on FOTO in order to improve overall function, decrease pain, and facilitate return to PLOF. Eval = complete at first follow up    Frequency / Duration: Patient to be seen 1 times per week for 5 treatments. Patient/ Caregiver education and instruction: Diagnosis, prognosis, self care, activity modification, brace/ splint application and exercises   [x]  Plan of care has been reviewed with FELIX Boykin, PT 6/23/2022 3:41 PM    ________________________________________________________________________    I certify that the above Therapy Services are being furnished while the patient is under my care. I agree with the treatment plan and certify that this therapy is necessary.     [de-identified] Signature:____________Date:_________TIME:________     Aimee Began, NP  ** Signature, Date and Time must be completed for valid certification **    Please sign and return to [facility] [address]  [address], [address] [phone] fax

## 2022-06-25 PROCEDURE — 93227 XTRNL ECG REC<48 HR R&I: CPT | Performed by: INTERNAL MEDICINE

## 2022-06-29 ENCOUNTER — HOSPITAL ENCOUNTER (OUTPATIENT)
Dept: PHYSICAL THERAPY | Age: 22
Discharge: HOME | End: 2022-06-29
Payer: OTHER GOVERNMENT

## 2022-06-29 PROCEDURE — 97110 THERAPEUTIC EXERCISES: CPT

## 2022-06-29 PROCEDURE — 97530 THERAPEUTIC ACTIVITIES: CPT

## 2022-06-29 PROCEDURE — 97112 NEUROMUSCULAR REEDUCATION: CPT

## 2022-06-29 NOTE — PROGRESS NOTES
PT DAILY TREATMENT NOTE     Patient Name: Emmett Oliver  Date:2022  : 2000  [x]  Patient  Verified  Payor:  / Plan: [facility] REGION / Product Type:  /    In time:346  Out time:436  Total Treatment Time (min): 50  Visit #: 2 of 5    Treatment Area: Pain in left knee [M25.562]  Pain in right knee [M25.561]    SUBJECTIVE  Pain Level (0-10 scale): 5.5-6  Any medication changes, allergies to medications, adverse drug reactions, diagnosis change, or new procedure performed?: [x] No    [] Yes (see summary sheet for update)  Subjective functional status/changes:   [] No changes reported  Pt reports she's having some increased pain today. She got the insoles and feel that her knees do a little bit better when she is wearing them. She had an ankle roll on Saturday while getting out of her car - she stretched it out and it felt better but she was able to work the rest of the night.      OBJECTIVE    Modality rationale: decrease pain to improve the patients ability to perform ADLs   After therapy    Min Type Additional Details    [] Estim:  []Unatt       []IFC  []Premod                        []Other:  []w/ice   []w/heat  Position:  Location:    [] Estim: []Att    []TENS instruct  []NMES                    []Other:  []w/US   []w/ice   []w/heat  Position:  Location:    []  Traction: [] Cervical       []Lumbar                       [] Prone          []Supine                       []Intermittent   []Continuous Lbs:  [] before manual  [] after manual    []  Ultrasound: []Continuous   [] Pulsed                           []1MHz   []3MHz W/cm2:  Location:    []  Iontophoresis with dexamethasone         Location: [] Take home patch   [] In clinic   10 [x]  Ice     []  heat  []  Ice massage  []  Laser   []  Anodyne Position: long sitting   Location: bilateral knees    []  Laser with stim  []  Other:  Position:  Location:    []  Vasopneumatic Device    []  Right     []  Left  Pre-treatment girth:  Post-treatment girth:  Measured at (location):  Pressure:       [] lo [] med [] hi   Temperature: [] lo [] med [] hi   [] Skin assessment post-treatment:  [x]intact []redness- no adverse reaction    []redness - adverse reaction:     8 min Therapeutic Exercise:  [x] See flow sheet :   Rationale: increase ROM and increase strength to improve the patients ability to perform ADLs     9 min Therapeutic Activity:  [x]  See flow sheet : standing functional LE strength    Rationale: increase strength and improve coordination  to improve the patients ability to perform work duties     23 min Neuromuscular Re-education:  [x]  See flow sheet : quad, glut, core, scap ashley   Rationale: increase strength, improve coordination and increase proprioception  to improve the pt's stability in weight bearing positions          With   [] TE   [] TA   [] neuro   [] other: Patient Education: [x] Review HEP    [] Progressed/Changed HEP based on:   [] positioning   [] body mechanics   [] transfers   [] heat/ice application    [] other:      Other Objective/Functional Measures: initiated exercises per flow sheet     Pain Level (0-10 scale) post treatment: 4-5    ASSESSMENT/Changes in Function: Pt was able to initiate exercises per flow sheet without increase in symptoms. Pt requiring cues to reduces ER substitutions with abduction exercises. Cuing to improve eccentric control and prevent momentum substitutions     Patient will continue to benefit from skilled PT services to modify and progress therapeutic interventions, address functional mobility deficits, address ROM deficits, address strength deficits, analyze and address soft tissue restrictions, analyze and cue movement patterns, analyze and modify body mechanics/ergonomics, assess and modify postural abnormalities, address imbalance/dizziness and instruct in home and community integration to attain remaining goals.      []  See Plan of Care  []  See progress note/recertification  []  See Discharge Summary         Progress towards goals / Updated goals:   Short Term Goals: To be accomplished in 2 weeks:  1. Pt will report compliance HEP in order to supplement PT treatment               Eval = established   Reports compliance     Long Term Goals: To be accomplished in 5 treatments:  1. Pt will demonstrate bilateral hip strength grossly 5-/5 in order to improve ability to perform work duties. Eval =  Grossly 4/5 except ext = 3+/5   Too soon to appreciate true strength gains; fatigued with abduction based exercises  2. Pt will demonstrate bilateral knee strength minimum 5-/5 in order to improve ambulation distances for ADLs and community engagement. Eval = grossly 4/5   Too soon to appreciate true strength gains; fatigued with abduction based exercises  3. Pt will score at least predicted value on FOTO in order to improve overall function, decrease pain, and facilitate return to OF.                Eval = complete at first follow up   39 with predicted value of 59    PLAN  [x]  Upgrade activities as tolerated     [x]  Continue plan of care  []  Update interventions per flow sheet       []  Discharge due to:_  []  Other:_      Mauricio Birmingham, PT 6/29/2022  3:55 PM    Future Appointments   Date Time Provider Codie Maria   7/6/2022  3:45 PM Juan Ramon July, PT [facility]   7/13/2022  3:45 PM Juan Ramon July, PT [facility]   7/20/2022  3:45 PM Juan Ramon July, PT [facility]   7/27/2022  3:45 PM Juan Ramon July, PT [facility]

## 2022-07-06 ENCOUNTER — APPOINTMENT (OUTPATIENT)
Dept: PHYSICAL THERAPY | Age: 22
End: 2022-07-06
Payer: OTHER GOVERNMENT

## 2022-07-13 ENCOUNTER — HOSPITAL ENCOUNTER (OUTPATIENT)
Dept: PHYSICAL THERAPY | Age: 22
Discharge: HOME | End: 2022-07-13
Payer: OTHER GOVERNMENT

## 2022-07-13 PROCEDURE — 97112 NEUROMUSCULAR REEDUCATION: CPT

## 2022-07-13 PROCEDURE — 97530 THERAPEUTIC ACTIVITIES: CPT

## 2022-07-13 PROCEDURE — 97110 THERAPEUTIC EXERCISES: CPT

## 2022-07-13 NOTE — PROGRESS NOTES
PT DAILY TREATMENT NOTE     Patient Name: Lizz Rivero  Date:2022  : 2000  [x]  Patient  Verified  Payor:  / Plan: [facility]  [facility] / Product Type:  /    In time:346  Out time:436  Total Treatment Time (min): 50  Visit #: 3 of 5    Treatment Area: Pain in left knee [M25.562]  Pain in right knee [M25.561]    SUBJECTIVE  Pain Level (0-10 scale): 3  Any medication changes, allergies to medications, adverse drug reactions, diagnosis change, or new procedure performed?: [x] No    [] Yes (see summary sheet for update)  Subjective functional status/changes:   [] No changes reported  Pt reports they had a bad week last week. They passed out before going to work on  and did not go in on Monday. Their MD did not see large abnormalities on the heart rate monitor so she is sending them to a cardiologist. Pt started feeling better by Friday and has been doing well this week. They are doing HEP and working on finding the best time to do the exercises.      OBJECTIVE    10 min Therapeutic Exercise:  [x] See flow sheet :   Rationale: increase ROM and increase strength to improve the patients ability to perform ADLs     17 min Therapeutic Activity:  [x]  See flow sheet : functional LE strength    Rationale: increase strength and improve coordination  to improve the patients ability to perform work duties     23 min Neuromuscular Re-education:  [x]  See flow sheet : quad, glut, UE ashley   Rationale: increase strength, improve coordination, improve balance and increase proprioception  to improve the patients ability to lift at work    With   [] TE   [] TA   [] neuro   [] other: Patient Education: [x] Review HEP    [] Progressed/Changed HEP based on:   [] positioning   [] body mechanics   [] transfers   [] heat/ice application    [] other:      Other Objective/Functional Measures: progressed exercises per flow sheet      Pain Level (0-10 scale) post treatment: 4-5    ASSESSMENT/Changes in Function: Pt tolerated progression of exercises with minimal increase in symptoms. Pt did well with eccentric tap downs with mirror feedback to prevent dynamic knee valgus. Pt reported feeling dizzy and shaky during exercise; deferred further activity to sit. Educated pt on eating before physical activity in order to have enough energy for exercise. Patient will continue to benefit from skilled PT services to modify and progress therapeutic interventions, address functional mobility deficits, address ROM deficits, address strength deficits, analyze and address soft tissue restrictions, analyze and cue movement patterns, analyze and modify body mechanics/ergonomics, assess and modify postural abnormalities, address imbalance/dizziness and instruct in home and community integration to attain remaining goals. []  See Plan of Care  []  See progress note/recertification  []  See Discharge Summary         Progress towards goals / Updated goals:  Short Term Goals: To be accomplished in 2 weeks: 1.   Pt will report compliance HEP in order to supplement PT treatment               Eval = established              update NV; verbal update to include wrist exercises      Long Term Goals: To be accomplished in 5 treatments:  1.   Pt will demonstrate bilateral hip strength grossly 5-/5 in order to improve ability to perform work duties.              Eval =  Grossly 4/5 except ext = 3+/5   Tolerating progression of exercises; reassess NV  2.   Pt will demonstrate bilateral knee strength minimum 5-/5 in order to improve ambulation distances for ADLs and community engagement.   Weyman Lowers = grossly 4/5   Tolerating progression of exercises; reassess NV  3.   Pt will score at least predicted value on FOTO in order to improve overall function, decrease pain, and facilitate return to PLOF.                Eval = complete at first follow up              39 with predicted value of 59   To be reassessed at end of POC       PLAN  []  Upgrade activities as tolerated     []  Continue plan of care  []  Update interventions per flow sheet       []  Discharge due to:_  []  Other:_      Lucinda Bernard, PT 7/13/2022  3:55 PM    Future Appointments   Date Time Provider Codie Maria   7/20/2022  3:45 PM Damien Reaves [facility]   7/27/2022  3:45 PM Lilian Fiore PTA [facility]

## 2022-07-20 ENCOUNTER — HOSPITAL ENCOUNTER (OUTPATIENT)
Dept: PHYSICAL THERAPY | Age: 22
Discharge: HOME | End: 2022-07-20
Payer: OTHER GOVERNMENT

## 2022-07-20 PROCEDURE — 97530 THERAPEUTIC ACTIVITIES: CPT

## 2022-07-20 PROCEDURE — 97112 NEUROMUSCULAR REEDUCATION: CPT

## 2022-07-20 PROCEDURE — 97535 SELF CARE MNGMENT TRAINING: CPT

## 2022-07-20 PROCEDURE — 97110 THERAPEUTIC EXERCISES: CPT

## 2022-07-20 NOTE — PROGRESS NOTES
PT DISCHARGE DAILY NOTE AND [id number]    Patient name: Catherine Duque Start of Care: 2022   Referral source: Kim Scott NP : 2000                Medical Diagnosis: Pain in left knee [M25.562]  Pain in right knee [M25.561]  Payor:  / Plan: [employer] 73 / Product Type: [facility] [employer] /  Onset Date:chronic with exacerbation 22                Treatment Diagnosis: right knee pain, left knee pain, generalized weakness   Prior Hospitalization: see medical history Provider#: [id number]   Medications: Verified on Patient summary List    Comorbidities: possible EDS and POTS, depression    Prior Level of Function: functionally (I); works delivering [employer]    Visits from [facility] Care: 4    Missed Visits: 1    Reporting Period : 22 to 22    Date:2022  : 2000  [x]  Patient  Verified  Payor:  / Plan: [employer] 73 / Product Type:  /    In time:337  Out time:445  Total Treatment Time (min): 76  Visit #: 4 of 5    SUBJECTIVE  Pain Level (0-10 scale): 0  Any medication changes, allergies to medications, adverse drug reactions, diagnosis change, or new procedure performed?: [x] No    [] Yes (see summary sheet for update)  Subjective functional status/changes:   [] No changes reported  Pt feels they are ready for DC and manage at home.      OBJECTIVE    10 min Therapeutic Exercise:  [] See flow sheet : explanation, demonstration, performance and distribution of final HEP   Rationale: increase ROM and increase strength to improve the patients ability to perform ADLs     38 min Therapeutic Activity:  [x]  See flow sheet : standing functional LE strength, squats, sit to stands, reassessment   Rationale: increase strength and improve coordination  to improve the patients ability to perform work duties       10 min Neuromuscular Re-education:  [x]  See flow sheet : core and scap ashley   Rationale: increase strength and improve coordination  to improve the patients ability to tolerate repetitive motions     10 min Home Management/Self Care:  education on exercise progression, preparation for home management of symptoms and systematic progressive overload with plans to attend a [employer]    Rationale: education to improve pt's understanding of self management of symptoms/exercise           With   [] TE   [] TA   [] neuro   [] other: Patient Education: [x] Review HEP    [] Progressed/Changed HEP based on:   [] positioning   [] body mechanics   [] transfers   [] heat/ice application    [] other:      Other Objective/Functional Measures:   Functional Gains: improved leg strength, improved activity tolerance  Functional Deficits: continue pain, difficulty with prolonged walking, instability of knee caps  % improvement: 15-20%  Pain   Average: 4/10       Best: 2/10     Worst: 6/10  Patient Goal: \"to strengthen my muscles so I can have fewer issues than I do right now\"     Pain Level (0-10 scale) post treatment: 2    Summary of Care:  Short Term Goals: To be accomplished in 2 weeks:  1. Pt will report compliance HEP in order to supplement PT treatment               Eval = established              MET     Long Term Goals: To be accomplished in 5 treatments:  1. Pt will demonstrate bilateral hip strength grossly 5-/5 in order to improve ability to perform work duties. Eval =  Grossly 4/5 except ext = 3+/5               MET grossly 5/5 into all planes  2. Pt will demonstrate bilateral knee strength minimum 5-/5 in order to improve ambulation distances for ADLs and community engagement. Eval = grossly 4/5               MET grossly 5/5 into all planes  3. Pt will score at least predicted value on FOTO in order to improve overall function, decrease pain, and facilitate return to PLOF.                Eval = complete at first follow up              39 with predicted value of 59               Progressed well, not met = 56    ASSESSMENT/Changes in Function: John Alaniz reports 15-20% improvement since beginning therapy. Objectively, pt has significantly improved her strength but reports of functional change are limited. Pt continues to have feeling of instability/subluxation at bilateral patella and multi joint pain. Based on pt's progress, consistent compliance with HEP, and confidence in ability to be independent in future care, pt was D/C with updated HEP today.        Thank you for this referral!      PLAN  [x]Discontinue therapy: [x]Patient has reached or is progressing toward set goals      []Patient is non-compliant or has abdicated      []Due to lack of appreciable progress towards set goals    Aide Ashraf, PT 7/20/2022  3:51 PM

## 2022-07-20 NOTE — PROGRESS NOTES
Physical Therapy Discharge Instructions    [facility] - [place] [id number]  [address], [address] [phone] fax    Patient: Emmett Oliver  : 2000    Continue Home Exercise Program 1 times per day for 4 weeks, then decrease to 3-5 times per week      Continue with    [x] Ice  as needed      [x] Heat           Follow up with MD:     [] Upon completion of therapy     [x] As needed      Recommendations:     [x]   Return to activity with home program    []   Return to activity with the following modifications:       []Post Rehab Program    []Join Independent aquatic program     [x]Return to/join local gym      Additional Comments: Keep up the good work! Let us know if you have any questions.        Destiney Yi, PT 2022 4:01 PM

## 2022-07-27 ENCOUNTER — APPOINTMENT (OUTPATIENT)
Dept: PHYSICAL THERAPY | Age: 22
End: 2022-07-27
Payer: OTHER GOVERNMENT

## 2022-12-21 ENCOUNTER — TRANSCRIBE ORDER (OUTPATIENT)
Dept: SCHEDULING | Age: 22
End: 2022-12-21

## 2022-12-21 DIAGNOSIS — N92.1 BREAKTHROUGH BLEEDING ON OCPS: Primary | ICD-10-CM

## 2023-01-05 ENCOUNTER — HOSPITAL ENCOUNTER (OUTPATIENT)
Dept: ULTRASOUND IMAGING | Age: 23
Discharge: HOME | End: 2023-01-05
Attending: NURSE PRACTITIONER
Payer: OTHER GOVERNMENT

## 2023-01-05 DIAGNOSIS — N92.1 BREAKTHROUGH BLEEDING ON OCPS: ICD-10-CM

## 2023-01-05 PROCEDURE — 93975 VASCULAR STUDY: CPT

## 2023-02-14 DIAGNOSIS — M22.2X2 PATELLOFEMORAL SYNDROME OF LEFT KNEE: Primary | ICD-10-CM

## 2023-02-14 DIAGNOSIS — M22.2X1 PATELLOFEMORAL SYNDROME OF RIGHT KNEE: ICD-10-CM

## 2024-11-16 ENCOUNTER — HOSPITAL ENCOUNTER (EMERGENCY)
Facility: HOSPITAL | Age: 24
Discharge: HOME | End: 2024-11-16
Attending: EMERGENCY MEDICINE
Payer: MEDICAID

## 2024-11-16 VITALS
HEIGHT: 66 IN | OXYGEN SATURATION: 97 % | BODY MASS INDEX: 40.18 KG/M2 | HEART RATE: 71 BPM | WEIGHT: 250 LBS | RESPIRATION RATE: 18 BRPM | TEMPERATURE: 98.4 F | SYSTOLIC BLOOD PRESSURE: 99 MMHG | DIASTOLIC BLOOD PRESSURE: 57 MMHG

## 2024-11-16 DIAGNOSIS — G43.001 MIGRAINE WITHOUT AURA AND WITH STATUS MIGRAINOSUS, NOT INTRACTABLE: Primary | ICD-10-CM

## 2024-11-16 PROCEDURE — 6360000002 HC RX W HCPCS: Performed by: EMERGENCY MEDICINE

## 2024-11-16 PROCEDURE — 96375 TX/PRO/DX INJ NEW DRUG ADDON: CPT

## 2024-11-16 PROCEDURE — 96374 THER/PROPH/DIAG INJ IV PUSH: CPT

## 2024-11-16 PROCEDURE — 99284 EMERGENCY DEPT VISIT MOD MDM: CPT

## 2024-11-16 PROCEDURE — 96372 THER/PROPH/DIAG INJ SC/IM: CPT

## 2024-11-16 RX ORDER — ONDANSETRON 2 MG/ML
4 INJECTION INTRAMUSCULAR; INTRAVENOUS
Status: COMPLETED | OUTPATIENT
Start: 2024-11-16 | End: 2024-11-16

## 2024-11-16 RX ORDER — KETOROLAC TROMETHAMINE 15 MG/ML
15 INJECTION, SOLUTION INTRAMUSCULAR; INTRAVENOUS EVERY 6 HOURS PRN
Status: DISCONTINUED | OUTPATIENT
Start: 2024-11-16 | End: 2024-11-16 | Stop reason: HOSPADM

## 2024-11-16 RX ORDER — DEXAMETHASONE SODIUM PHOSPHATE 4 MG/ML
4 INJECTION, SOLUTION INTRA-ARTICULAR; INTRALESIONAL; INTRAMUSCULAR; INTRAVENOUS; SOFT TISSUE
Status: COMPLETED | OUTPATIENT
Start: 2024-11-16 | End: 2024-11-16

## 2024-11-16 RX ORDER — METHYLPREDNISOLONE 4 MG/1
TABLET ORAL
Qty: 1 KIT | Refills: 0 | Status: SHIPPED | OUTPATIENT
Start: 2024-11-16 | End: 2024-11-22

## 2024-11-16 RX ORDER — KETOROLAC TROMETHAMINE 10 MG/1
10 TABLET, FILM COATED ORAL EVERY 6 HOURS PRN
Qty: 20 TABLET | Refills: 0 | Status: SHIPPED | OUTPATIENT
Start: 2024-11-16

## 2024-11-16 RX ADMIN — ONDANSETRON 4 MG: 2 INJECTION INTRAMUSCULAR; INTRAVENOUS at 02:35

## 2024-11-16 RX ADMIN — DEXAMETHASONE SODIUM PHOSPHATE 4 MG: 4 INJECTION INTRA-ARTICULAR; INTRALESIONAL; INTRAMUSCULAR; INTRAVENOUS; SOFT TISSUE at 02:35

## 2024-11-16 RX ADMIN — KETOROLAC TROMETHAMINE 15 MG: 15 INJECTION, SOLUTION INTRAMUSCULAR; INTRAVENOUS at 02:35

## 2024-11-16 ASSESSMENT — PAIN DESCRIPTION - LOCATION
LOCATION: HEAD
LOCATION: HEAD

## 2024-11-16 ASSESSMENT — ENCOUNTER SYMPTOMS
GASTROINTESTINAL NEGATIVE: 1
RESPIRATORY NEGATIVE: 1
SORE THROAT: 0

## 2024-11-16 ASSESSMENT — LIFESTYLE VARIABLES
HOW MANY STANDARD DRINKS CONTAINING ALCOHOL DO YOU HAVE ON A TYPICAL DAY: PATIENT DOES NOT DRINK
HOW OFTEN DO YOU HAVE A DRINK CONTAINING ALCOHOL: NEVER

## 2024-11-16 ASSESSMENT — PAIN - FUNCTIONAL ASSESSMENT: PAIN_FUNCTIONAL_ASSESSMENT: 0-10

## 2024-11-16 ASSESSMENT — PAIN SCALES - GENERAL
PAINLEVEL_OUTOF10: 6
PAINLEVEL_OUTOF10: 2

## 2024-11-16 NOTE — ED PROVIDER NOTES
`[facility] EMERGENCY DEPT  eMERGENCY dEPARTMENT eNCOUnter      Pt Name: Sandhya Ward  MRN: [id number]  Birthdate 2000 of evaluation: 11/16/2024  Provider:Harinder Ayala MD    CHIEF COMPLAINT       HPI    Sandhya Ward is a 24 y.o. female with history of migraine headaches.  She has been seen by many neurologists for her headache.  Patient states that she sometimes get relief with methyl prednisone,  Toradol  and Zofran combination that gives her relief    ROS  Review of Systems   HENT:  Negative for congestion and sore throat.    Respiratory: Negative.     Cardiovascular: Negative.    Gastrointestinal: Negative.    Genitourinary: Negative.    Neurological:  Positive for headaches. Negative for dizziness, tremors, seizures, syncope, light-headedness and numbness.   Psychiatric/Behavioral: Negative.     All other systems reviewed and are negative.      Except as noted above the remainder of the review of systems was reviewed and negative.       PAST MEDICAL HISTORY     Past Medical History:   Diagnosis Date    Autism     Depression     anxiety    GERD (gastroesophageal reflux disease)     Headache     migraines    Insomnia     Major depression     Migraine headache     PCOS (polycystic ovarian syndrome)     Polycystic ovarian disease        SURGICAL HISTORY       Past Surgical History:   Procedure Laterality Date    COLONOSCOPY N/A 4/26/2022    COLONOSCOPY, bx's, polypectomy performed by Darren Rowland MD at [facility] ENDOSCOPY         CURRENTMEDICATIONS       Previous Medications    BUSPIRONE (BUSPAR) 7.5 MG TABLET    ceived the following from [facility]: Outside name: busPIRone (BUSPAR) 7.5 mg tablet    GALCANEZUMAB-GNLM (EMGALITY) 120 MG/ML SOAJ    ceived the following from [facility]: Outside name: Emgality Pen 120 mg/mL injection    GUANFACINE (INTUNIV) 1 MG TB24 EXTENDED RELEASE TABLET    Take 1 mg by mouth daily    HYDROXYZINE HCL (ATARAX) 25 MG TABLET    ceived the following

## 2024-11-16 NOTE — ED TRIAGE NOTES
A&O female with c/o HA x 3 days. Hx of migraines, currently with no prescribed medication for migraine relief. Has had no relief from OTC medications.

## 2024-11-16 NOTE — DISCHARGE INSTRUCTIONS
Follow-up neurologist in 2 to 3 days.  Return ER needed.    Rx: Prednisone Dosepak, prednisone and torodol

## (undated) DEVICE — LINER SUCT CANSTR 3000CC PLAS SFT PRE ASSEMB W/OUT TBNG W/

## (undated) DEVICE — STERILE POLYISOPRENE POWDER-FREE SURGICAL GLOVES: Brand: PROTEXIS

## (undated) DEVICE — GAUZE,SPONGE,4"X4",16PLY,STRL,LF,10/TRAY: Brand: MEDLINE

## (undated) DEVICE — SOLUTION IRRIG 1000ML H2O STRL BLT

## (undated) DEVICE — TRAP SPEC COLL POLYP POLYSTYR --

## (undated) DEVICE — MEDI-VAC NON-CONDUCTIVE SUCTION TUBING: Brand: CARDINAL HEALTH

## (undated) DEVICE — CANNULA NSL AD TBNG L14FT STD PVC O2 CRV CONN NONFLARED NSL

## (undated) DEVICE — FLUFF AND POLYMER UNDERPAD,EXTRA HEAVY: Brand: WINGS

## (undated) DEVICE — SYRINGE MED 25GA 3ML L5/8IN SUBQ PLAS W/ DETACH NDL SFTY

## (undated) DEVICE — CANNULA ORIG TL CLR W FOAM CUSHIONS AND 14FT SUPL TB 3 CHN

## (undated) DEVICE — BITE BLOCK ENDOSCP UNIV AD 6 TO 9.4 MM

## (undated) DEVICE — FCPS RAD JAW 4LC 240CM W/NDL -- BX/20 RADIAL JAW 4

## (undated) DEVICE — YANKAUER,SMOOTH HANDLE,HIGH CAPACITY: Brand: MEDLINE INDUSTRIES, INC.

## (undated) DEVICE — AIRLIFE™ NASAL OXYGEN CANNULA CURVED, FLARED TIP WITH 14 FOOT (4.3 M) CRUSH-RESISTANT TUBING, OVER-THE-EAR STYLE: Brand: AIRLIFE™

## (undated) DEVICE — BASIN EMESIS 500CC ROSE 250/CS 60/PLT: Brand: MEDEGEN MEDICAL PRODUCTS, LLC

## (undated) DEVICE — FORCEPS BX L240CM JAW DIA2.8MM L CAP W/ NDL MIC MESH TOOTH

## (undated) DEVICE — SYR 20ML LL STRL LF --

## (undated) DEVICE — SYR 10ML LUER LOK 1/5ML GRAD --

## (undated) DEVICE — GOWN ISOL IMPERV UNIV, DISP, OPEN BACK, BLUE --

## (undated) DEVICE — SNARE POLYP M W27MMXL240CM OVL STIFF DISP CAPTIVATOR

## (undated) DEVICE — SYR 50ML SLIP TIP NSAF LF STRL --

## (undated) DEVICE — ENDOSCOPY PUMP TUBING/ CAP SET: Brand: ERBE

## (undated) DEVICE — CATHETER SUCT TR FL TIP 14FR W/ O CTRL